# Patient Record
Sex: MALE | Race: WHITE | NOT HISPANIC OR LATINO | Employment: FULL TIME | RURAL
[De-identification: names, ages, dates, MRNs, and addresses within clinical notes are randomized per-mention and may not be internally consistent; named-entity substitution may affect disease eponyms.]

---

## 2018-09-10 ENCOUNTER — OFFICE VISIT (OUTPATIENT)
Dept: FAMILY MEDICINE CLINIC | Facility: CLINIC | Age: 48
End: 2018-09-10

## 2018-09-10 VITALS
WEIGHT: 152 LBS | DIASTOLIC BLOOD PRESSURE: 86 MMHG | OXYGEN SATURATION: 98 % | HEART RATE: 77 BPM | TEMPERATURE: 100.1 F | BODY MASS INDEX: 20.15 KG/M2 | SYSTOLIC BLOOD PRESSURE: 135 MMHG | HEIGHT: 73 IN

## 2018-09-10 DIAGNOSIS — J01.90 ACUTE SINUSITIS, RECURRENCE NOT SPECIFIED, UNSPECIFIED LOCATION: Primary | ICD-10-CM

## 2018-09-10 DIAGNOSIS — R05.9 COUGH: ICD-10-CM

## 2018-09-10 PROCEDURE — 99214 OFFICE O/P EST MOD 30 MIN: CPT | Performed by: NURSE PRACTITIONER

## 2018-09-10 PROCEDURE — 96372 THER/PROPH/DIAG INJ SC/IM: CPT | Performed by: NURSE PRACTITIONER

## 2018-09-10 RX ORDER — TRIAMCINOLONE ACETONIDE 40 MG/ML
60 INJECTION, SUSPENSION INTRA-ARTICULAR; INTRAMUSCULAR ONCE
Status: COMPLETED | OUTPATIENT
Start: 2018-09-10 | End: 2018-09-10

## 2018-09-10 RX ORDER — AMOXICILLIN AND CLAVULANATE POTASSIUM 875; 125 MG/1; MG/1
1 TABLET, FILM COATED ORAL EVERY 12 HOURS SCHEDULED
Qty: 20 TABLET | Refills: 0 | Status: SHIPPED | OUTPATIENT
Start: 2018-09-10 | End: 2018-09-20

## 2018-09-10 RX ORDER — CEFTRIAXONE 1 G/1
1 INJECTION, POWDER, FOR SOLUTION INTRAMUSCULAR; INTRAVENOUS ONCE
Status: COMPLETED | OUTPATIENT
Start: 2018-09-10 | End: 2018-09-10

## 2018-09-10 RX ADMIN — CEFTRIAXONE 1 G: 1 INJECTION, POWDER, FOR SOLUTION INTRAMUSCULAR; INTRAVENOUS at 14:48

## 2018-09-10 RX ADMIN — TRIAMCINOLONE ACETONIDE 60 MG: 40 INJECTION, SUSPENSION INTRA-ARTICULAR; INTRAMUSCULAR at 14:49

## 2018-09-10 NOTE — PATIENT INSTRUCTIONS

## 2018-09-10 NOTE — PROGRESS NOTES
"Subjective   Jero Dutta is a 48 y.o. male.     [facility] Visit    PCP: Dr. Santo    CC: \"headache, runny nose, sinus pressure\"      Sinusitis   This is a new problem. The current episode started in the past 7 days. The problem has been gradually worsening since onset. Maximum temperature: low grade only. His pain is at a severity of 6/10. Associated symptoms include congestion, coughing, ear pain ( left), headaches, sinus pressure, sneezing and a sore throat ( scratchy). Pertinent negatives include no chills, diaphoresis, hoarse voice, neck pain, shortness of breath or swollen glands. Treatments tried: advil sinus. The treatment provided mild relief.        The following portions of the patient's history were reviewed and updated as appropriate: allergies, current medications, past medical history, past social history, past surgical history and problem list.    Review of Systems   Constitutional: Positive for fever ( low grade only). Negative for appetite change, chills and diaphoresis.   HENT: Positive for congestion, ear pain ( left), postnasal drip, rhinorrhea, sinus pressure, sneezing and sore throat ( scratchy). Negative for hoarse voice.    Eyes: Positive for discharge (watery). Negative for itching.   Respiratory: Positive for cough. Negative for chest tightness, shortness of breath and wheezing.    Cardiovascular: Negative for chest pain and leg swelling.   Gastrointestinal: Negative for nausea and vomiting.   Musculoskeletal: Negative for neck pain and neck stiffness.   Skin: Negative for rash.   Neurological: Positive for headache. Negative for dizziness.   Hematological: Negative for adenopathy.       Objective    /86 (BP Location: Left arm, Patient Position: Sitting)   Pulse 77   Temp 100.1 °F (37.8 °C)   Ht 185.4 cm (73\")   Wt 68.9 kg (152 lb)   SpO2 98%   BMI 20.05 kg/m²     Physical Exam   Constitutional: He is oriented to person, place, and time. He appears well-developed " and well-nourished. Distressed:  no distress, but does not appear to feel well.   HENT:   Head: Normocephalic and atraumatic.   Right Ear: Tympanic membrane is not erythematous. A middle ear effusion is present.   Left Ear: Tympanic membrane is not erythematous. A middle ear effusion is present.   Nose: Mucosal edema and congestion present. Right sinus exhibits frontal sinus tenderness. Right sinus exhibits no maxillary sinus tenderness. Left sinus exhibits frontal sinus tenderness. Left sinus exhibits no maxillary sinus tenderness.   Mouth/Throat: Uvula is midline and mucous membranes are normal. Posterior oropharyngeal erythema ( with large amount PND) present. No oropharyngeal exudate.   Eyes: Right eye exhibits discharge (watery). Left eye exhibits discharge ( watery). Right conjunctiva is injected ( mild). Left conjunctiva is injected (mild).   Neck: Neck supple.   Cardiovascular: Normal rate and regular rhythm.    Pulmonary/Chest: Effort normal and breath sounds normal. He has no wheezes. He has no rales.   +loose cough   Lymphadenopathy:     He has cervical adenopathy (shotty).   Neurological: He is alert and oriented to person, place, and time.   Nursing note and vitals reviewed.        Assessment/Plan   Jero was seen today for headache and nasal congestion.    Diagnoses and all orders for this visit:    Acute sinusitis, recurrence not specified, unspecified location  -     amoxicillin-clavulanate (AUGMENTIN) 875-125 MG per tablet; Take 1 tablet by mouth Every 12 (Twelve) Hours for 10 days.  -     cefTRIAXone (ROCEPHIN) injection 1 g; Inject 1 g into the appropriate muscle as directed by prescriber 1 (One) Time.  -     triamcinolone acetonide (KENALOG-40) injection 60 mg; Inject 1.5 mL into the appropriate muscle as directed by prescriber 1 (One) Time.    Cough      Push fluids  Rest  May continue with Advil cold/sinus  Rocephin and kenalog injections in office at his request  Rx for Augmentin  Declines  cough med    See PCP or RTC if symptoms persist/worsen    Declines RTW note